# Patient Record
Sex: MALE | URBAN - METROPOLITAN AREA
[De-identification: names, ages, dates, MRNs, and addresses within clinical notes are randomized per-mention and may not be internally consistent; named-entity substitution may affect disease eponyms.]

---

## 2018-12-24 ENCOUNTER — IMPORTED ENCOUNTER (OUTPATIENT)
Dept: URBAN - METROPOLITAN AREA CLINIC 1 | Facility: CLINIC | Age: 58
End: 2018-12-24

## 2018-12-24 PROBLEM — H53.19: Noted: 2018-12-24

## 2018-12-24 PROBLEM — H25.813: Noted: 2018-12-24

## 2018-12-24 PROCEDURE — 92004 COMPRE OPH EXAM NEW PT 1/>: CPT

## 2018-12-24 PROCEDURE — 92015 DETERMINE REFRACTIVE STATE: CPT

## 2018-12-24 NOTE — PATIENT DISCUSSION
1.  Photopsia OS -- RD Precautions Given. 2.  Cataracts OU -- Observe for now without intervention. The patient was advised to contact us if any change or worsening of vision. Finalized Glasses MRx was given to patient today. Return for an appointment in 1 YR for a 30 OU with Dr. Bertha Devlin.

## 2022-04-02 ASSESSMENT — VISUAL ACUITY
OD_CC: 20/30-2
OS_CC: 20/40

## 2022-04-02 ASSESSMENT — TONOMETRY
OS_IOP_MMHG: 20
OD_IOP_MMHG: 20

## 2025-02-17 ENCOUNTER — NEW PATIENT (OUTPATIENT)
Age: 65
End: 2025-02-17

## 2025-02-17 DIAGNOSIS — Z01.00: ICD-10-CM

## 2025-02-17 DIAGNOSIS — H52.4: ICD-10-CM

## 2025-02-17 DIAGNOSIS — H52.03: ICD-10-CM

## 2025-02-17 PROCEDURE — 92004 COMPRE OPH EXAM NEW PT 1/>: CPT

## 2025-02-17 PROCEDURE — 92015 DETERMINE REFRACTIVE STATE: CPT
